# Patient Record
Sex: MALE | Race: WHITE | NOT HISPANIC OR LATINO
[De-identification: names, ages, dates, MRNs, and addresses within clinical notes are randomized per-mention and may not be internally consistent; named-entity substitution may affect disease eponyms.]

---

## 2021-12-16 PROBLEM — Z00.00 ENCOUNTER FOR PREVENTIVE HEALTH EXAMINATION: Status: ACTIVE | Noted: 2021-12-16

## 2021-12-21 ENCOUNTER — NON-APPOINTMENT (OUTPATIENT)
Age: 61
End: 2021-12-21

## 2021-12-23 ENCOUNTER — NON-APPOINTMENT (OUTPATIENT)
Age: 61
End: 2021-12-23

## 2021-12-27 ENCOUNTER — APPOINTMENT (OUTPATIENT)
Dept: COLORECTAL SURGERY | Facility: CLINIC | Age: 61
End: 2021-12-27
Payer: MEDICAID

## 2021-12-27 ENCOUNTER — NON-APPOINTMENT (OUTPATIENT)
Age: 61
End: 2021-12-27

## 2021-12-27 VITALS
TEMPERATURE: 98.1 F | SYSTOLIC BLOOD PRESSURE: 142 MMHG | DIASTOLIC BLOOD PRESSURE: 88 MMHG | HEIGHT: 73 IN | WEIGHT: 310 LBS | HEART RATE: 92 BPM | BODY MASS INDEX: 41.08 KG/M2

## 2021-12-27 DIAGNOSIS — K60.2 ANAL FISSURE, UNSPECIFIED: ICD-10-CM

## 2021-12-27 PROCEDURE — 99202 OFFICE O/P NEW SF 15 MIN: CPT

## 2021-12-27 NOTE — ASSESSMENT
[FreeTextEntry1] : I had an extensive discussion with the patient (30 minutes) regarding the diagnosis of an anal fissure. 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 The medical and surgical management options have been reviewed in detail including lubricant suppository therapy, stool softeners, fiber agents, and surgical anal dilatation. The risks, benefits and alternatives including bleeding infection, nonhealing wounds, and permanent leakage, seepage and incontinence have been detailed. All questions have been reviewed and answered. Appropriate literature has been shared with the patient.\par

## 2021-12-27 NOTE — HISTORY OF PRESENT ILLNESS
[FreeTextEntry1] : 61-year-old male presents for evaluation of 2 to 3-week history of anal pain.  Reports prior history of anal fissure 10 years ago.  Symptoms relieved after treatment with Botox.  Currently denies rectal bleeding.  Mild change in bowel habits.  Occasional narrow stools.  Last colonoscopy 2013.

## 2021-12-27 NOTE — PHYSICAL EXAM
[Excoriation] : no perianal excoriation [Normal] : was normal [Posterior] : posteriorly [None] : there was no rectal mass